# Patient Record
Sex: MALE | Race: OTHER | Employment: FULL TIME | URBAN - METROPOLITAN AREA
[De-identification: names, ages, dates, MRNs, and addresses within clinical notes are randomized per-mention and may not be internally consistent; named-entity substitution may affect disease eponyms.]

---

## 2018-04-12 ENCOUNTER — HOSPITAL ENCOUNTER (EMERGENCY)
Facility: HOSPITAL | Age: 28
Discharge: HOME | End: 2018-04-12
Attending: EMERGENCY MEDICINE
Payer: COMMERCIAL

## 2018-04-12 ENCOUNTER — APPOINTMENT (OUTPATIENT)
Dept: CT IMAGING | Facility: HOSPITAL | Age: 28
End: 2018-04-12
Attending: EMERGENCY MEDICINE
Payer: COMMERCIAL

## 2018-04-12 VITALS
HEART RATE: 60 BPM | RESPIRATION RATE: 17 BRPM | WEIGHT: 165 LBS | OXYGEN SATURATION: 97 % | TEMPERATURE: 98 F | BODY MASS INDEX: 26.52 KG/M2 | HEIGHT: 66 IN | DIASTOLIC BLOOD PRESSURE: 66 MMHG | SYSTOLIC BLOOD PRESSURE: 129 MMHG

## 2018-04-12 DIAGNOSIS — K52.9 PROCTOCOLITIS: Primary | ICD-10-CM

## 2018-04-12 PROCEDURE — 85025 COMPLETE CBC W/AUTO DIFF WBC: CPT | Performed by: EMERGENCY MEDICINE

## 2018-04-12 PROCEDURE — 80053 COMPREHEN METABOLIC PANEL: CPT | Performed by: EMERGENCY MEDICINE

## 2018-04-12 PROCEDURE — 81001 URINALYSIS AUTO W/SCOPE: CPT | Performed by: EMERGENCY MEDICINE

## 2018-04-12 PROCEDURE — 96374 THER/PROPH/DIAG INJ IV PUSH: CPT

## 2018-04-12 PROCEDURE — 99284 EMERGENCY DEPT VISIT MOD MDM: CPT

## 2018-04-12 PROCEDURE — 74177 CT ABD & PELVIS W/CONTRAST: CPT | Performed by: EMERGENCY MEDICINE

## 2018-04-12 RX ORDER — METRONIDAZOLE 500 MG/1
500 TABLET ORAL 3 TIMES DAILY
Qty: 30 TABLET | Refills: 0 | Status: SHIPPED | OUTPATIENT
Start: 2018-04-12 | End: 2018-04-22

## 2018-04-12 RX ORDER — KETOROLAC TROMETHAMINE 30 MG/ML
30 INJECTION, SOLUTION INTRAMUSCULAR; INTRAVENOUS ONCE
Status: COMPLETED | OUTPATIENT
Start: 2018-04-12 | End: 2018-04-12

## 2018-04-12 NOTE — ED PROVIDER NOTES
Patient Seen in: [facility] Emergency Department    History   Patient presents with:  Abdomen/Flank Pain (GI/)    Stated Complaint: abdominal pain     HPI    69-year-old male comes the hospital complaint of having difficulty with pain in the area noted  Back nontender without CVA tenderness  Extremity no clubbing, cyanosis or edema noted.   Full range of motion noted without tenderness  Neuro: No focal deficits noted    ED Course     Labs Reviewed   URINALYSIS WITH CULTURE REFLEX - Abnormal; Notable reduction techniques were used. Dose information is transmitted to the [facility] [employer] [facility] [employer]) [facility] ([address]) which includes the Dose Index Registry.   PATIENT STATED HISTORY:(As transcribed by Technologist)  Patient sta diagnosis)    Disposition:  Discharge  4/12/2018  5:46 pm    Follow-up:  Gabe Cohen MD  [id number] [id number]    Schedule an appointment as soon as possible for a visit in 2 days          Medications Prescribed:  Current

## 2018-04-12 NOTE — ED INITIAL ASSESSMENT (HPI)
Abdominal pain for past 2 days. States it is his whole abdomen, constant dull pain with occasional sharp pain. Last bowel movement was this morning but states it was small, feels as though he is constipated. Denies urinary symptoms.

## 2018-10-24 ENCOUNTER — CHARTING TRANS (OUTPATIENT)
Dept: OTHER | Age: 28
End: 2018-10-24

## 2018-12-08 VITALS
HEIGHT: 67 IN | RESPIRATION RATE: 16 BRPM | WEIGHT: 164.99 LBS | BODY MASS INDEX: 25.9 KG/M2 | TEMPERATURE: 98.1 F | DIASTOLIC BLOOD PRESSURE: 68 MMHG | HEART RATE: 64 BPM | SYSTOLIC BLOOD PRESSURE: 122 MMHG

## (undated) NOTE — LETTER
Date & Time: 4/12/2018, 5:52 PM  Patient: Jerson Goodwin  Encounter Provider(s):    Everett Cobb MD       To Whom It May Concern:    Jerson Goodwin was seen and treated in our department on 4/12/2018. He may return to work on 4/13/2018.     If you have

## (undated) NOTE — ED AVS SNAPSHOT
Chelsi Zavala   MRN: [id number]    Department:  [facility] Emergency Department   Date of Visit:  4/12/2018           Disclosure     Insurance plans vary and the physician(s) referred by the ER may not be covered by your plan.  Please contact your tell this physician (or your personal doctor if your instructions are to return to your personal doctor) about any new or lasting problems. The primary care or specialist physician will see patients referred from the [facility] Emergency Department.  Dianelys Sun